# Patient Record
Sex: FEMALE | Race: WHITE
[De-identification: names, ages, dates, MRNs, and addresses within clinical notes are randomized per-mention and may not be internally consistent; named-entity substitution may affect disease eponyms.]

---

## 2017-02-20 ENCOUNTER — HOSPITAL ENCOUNTER (OUTPATIENT)
Dept: HOSPITAL 25 - ED | Age: 82
Setting detail: OBSERVATION
LOS: 1 days | Discharge: HOME | End: 2017-02-21
Attending: HOSPITALIST | Admitting: INTERNAL MEDICINE
Payer: MEDICARE

## 2017-02-20 DIAGNOSIS — I16.0: Primary | ICD-10-CM

## 2017-02-20 DIAGNOSIS — I67.82: ICD-10-CM

## 2017-02-20 DIAGNOSIS — Z79.899: ICD-10-CM

## 2017-02-20 DIAGNOSIS — I48.91: ICD-10-CM

## 2017-02-20 DIAGNOSIS — Z79.01: ICD-10-CM

## 2017-02-20 DIAGNOSIS — R51: ICD-10-CM

## 2017-02-20 DIAGNOSIS — Z85.51: ICD-10-CM

## 2017-02-20 DIAGNOSIS — M62.81: ICD-10-CM

## 2017-02-20 DIAGNOSIS — R00.1: ICD-10-CM

## 2017-02-20 DIAGNOSIS — E04.1: ICD-10-CM

## 2017-02-20 DIAGNOSIS — Z88.2: ICD-10-CM

## 2017-02-20 LAB
ALBUMIN SERPL BCG-MCNC: 3.9 G/DL (ref 3.2–5.2)
ALP SERPL-CCNC: 61 U/L (ref 34–104)
ALT SERPL W P-5'-P-CCNC: 10 U/L (ref 7–52)
ANION GAP SERPL CALC-SCNC: 7 MMOL/L (ref 2–11)
AST SERPL-CCNC: 14 U/L (ref 13–39)
BUN SERPL-MCNC: 24 MG/DL (ref 6–24)
BUN/CREAT SERPL: 23.3 (ref 8–20)
CALCIUM SERPL-MCNC: 10 MG/DL (ref 8.6–10.3)
CHLORIDE SERPL-SCNC: 107 MMOL/L (ref 101–111)
GLOBULIN SER CALC-MCNC: 2.7 G/DL (ref 2–4)
GLUCOSE SERPL-MCNC: 101 MG/DL (ref 70–100)
HCO3 SERPL-SCNC: 27 MMOL/L (ref 22–32)
HCT VFR BLD AUTO: 41 % (ref 35–47)
HGB BLD-MCNC: 13.7 G/DL (ref 12–16)
MCH RBC QN AUTO: 29 PG (ref 27–31)
MCHC RBC AUTO-ENTMCNC: 34 G/DL (ref 31–36)
MCV RBC AUTO: 85 FL (ref 80–97)
POTASSIUM SERPL-SCNC: 4.1 MMOL/L (ref 3.5–5)
PROT SERPL-MCNC: 6.6 G/DL (ref 6.4–8.9)
RBC # BLD AUTO: 4.75 10^6/UL (ref 4–5.4)
SODIUM SERPL-SCNC: 141 MMOL/L (ref 133–145)
TROPONIN I SERPL-MCNC: 0 NG/ML (ref ?–0.04)
WBC # BLD AUTO: 7.8 10^3/UL (ref 3.5–10.8)

## 2017-02-20 PROCEDURE — 70490 CT SOFT TISSUE NECK W/O DYE: CPT

## 2017-02-20 PROCEDURE — 36415 COLL VENOUS BLD VENIPUNCTURE: CPT

## 2017-02-20 PROCEDURE — 85025 COMPLETE CBC W/AUTO DIFF WBC: CPT

## 2017-02-20 PROCEDURE — 80048 BASIC METABOLIC PNL TOTAL CA: CPT

## 2017-02-20 PROCEDURE — 83036 HEMOGLOBIN GLYCOSYLATED A1C: CPT

## 2017-02-20 PROCEDURE — 71010: CPT

## 2017-02-20 PROCEDURE — 87086 URINE CULTURE/COLONY COUNT: CPT

## 2017-02-20 PROCEDURE — 96372 THER/PROPH/DIAG INJ SC/IM: CPT

## 2017-02-20 PROCEDURE — G0378 HOSPITAL OBSERVATION PER HR: HCPCS

## 2017-02-20 PROCEDURE — 93306 TTE W/DOPPLER COMPLETE: CPT

## 2017-02-20 PROCEDURE — 83605 ASSAY OF LACTIC ACID: CPT

## 2017-02-20 PROCEDURE — 80053 COMPREHEN METABOLIC PANEL: CPT

## 2017-02-20 PROCEDURE — 70450 CT HEAD/BRAIN W/O DYE: CPT

## 2017-02-20 PROCEDURE — 96374 THER/PROPH/DIAG INJ IV PUSH: CPT

## 2017-02-20 PROCEDURE — 84443 ASSAY THYROID STIM HORMONE: CPT

## 2017-02-20 PROCEDURE — 85730 THROMBOPLASTIN TIME PARTIAL: CPT

## 2017-02-20 PROCEDURE — 84484 ASSAY OF TROPONIN QUANT: CPT

## 2017-02-20 PROCEDURE — 80061 LIPID PANEL: CPT

## 2017-02-20 PROCEDURE — 70551 MRI BRAIN STEM W/O DYE: CPT

## 2017-02-20 PROCEDURE — 93005 ELECTROCARDIOGRAM TRACING: CPT

## 2017-02-20 PROCEDURE — 81003 URINALYSIS AUTO W/O SCOPE: CPT

## 2017-02-20 PROCEDURE — 85610 PROTHROMBIN TIME: CPT

## 2017-02-20 PROCEDURE — 81015 MICROSCOPIC EXAM OF URINE: CPT

## 2017-02-20 PROCEDURE — 99284 EMERGENCY DEPT VISIT MOD MDM: CPT

## 2017-02-20 RX ADMIN — AMLODIPINE BESYLATE SCH MG: 5 TABLET ORAL at 14:40

## 2017-02-20 RX ADMIN — LISINOPRIL SCH MG: 10 TABLET ORAL at 14:40

## 2017-02-20 RX ADMIN — HEPARIN SODIUM SCH UNITS: 5000 INJECTION INTRAVENOUS; SUBCUTANEOUS at 21:20

## 2017-02-20 RX ADMIN — Medication SCH DROP: at 21:20

## 2017-02-20 RX ADMIN — ACETAMINOPHEN PRN MG: 325 TABLET ORAL at 18:06

## 2017-02-20 RX ADMIN — Medication SCH: at 19:27

## 2017-02-20 NOTE — RAD
INDICATION: TIA symptoms     



COMPARISON: None



TECHNIQUE: Noncontrast axial source images were acquired from the skull base to the

vertex.



FINDINGS:



Ventricles/sulci: There is mild age-related cortical atrophy with compensatory dilatation

of the CSF spaces.



Brain parenchyma: There is mild periventricular and subcortical white matter change

compatible with chronic ischemia.



Intracranial hemorrhage:None.



Extra-axial spaces: There are no abnormal extra axial fluid collections or evidence of

extra-axial mass.



Calvarium: There is no calvarial fracture or other calvarial abnormality.



Scalp: There is no evidence of scalp or extracalvarial soft tissue abnormality.



Paranasal sinuses/mastoid: The paranasal sinuses and mastoid air cells are clear.



Other: None.



IMPRESSION: CORTICAL ATROPHY WITH CHRONIC MICROVASCULAR ISCHEMIC CHANGES. NO ACUTE

FINDINGS.

## 2017-02-20 NOTE — RAD
Indication: Left-sided numbness.



Sagittal and axial T1, axial T2, FLAIR, diffusion and susceptibility weighted images of

the brain were obtained.



Ventricular structures are midline. No midline shift is noted. There is central and

cortical atrophy noted. There is no evidence of intracranial mass or hemorrhage. No other

high or low signal lesions are identified. No restriction of diffusion is noted.



Periventricular signal abnormalities consistent with chronic ischemic White matter change

is noted.



Mastoid air cells and paranasal sinuses are unremarkable.



IMPRESSION: FINDINGS CONSISTENT WITH SMALL VESSEL ISCHEMIC DISEASE. NO ACUTE CHANGES ARE

NOTED.

## 2017-02-20 NOTE — HP
HISTORY AND PHYSICAL:

 

DATE OF ADMISSION:  02/20/17

 

PRIMARY CARE PROVIDER:  Dr. Sorto.

 

ATTENDING PHYSICIAN WHILE IN THE HOSPITAL:  Dontae Juan MD*(report dictated 
by Braxton Valderrama NP).

 

CONSULTING NEUROLOGIST:  Dr. Hinojosa.

 

CHIEF COMPLAINT:

1.  Left-sided decreased sensation.

2.  Elevated blood pressure.

 

HISTORY OF PRESENT ILLNESS:  Ms. Kumar is an 82-year-old female patient with a 
history of hypertension, atrial fibrillation in the past, history of kidney 
stones and bladder cancer.  She comes in today stating that over the last week, 
she had a cystoscopy for what she says a spot on her bladder.  Since then, she 
has been having difficulty with her blood pressure.  It has been elevated at 
times despite her taking her medications.  She states that she has had a 
headache off and on, and in addition to this, has had some left-sided ear pain 
and swelling of the left gland.  She does state that she noticed today she woke 
up, her blood pressure was very elevated.  Yesterday, she took actually 80 mg 
of her lisinopril thinking that that would bring it down but her blood pressure 
remained elevated despite this and it has been all week, it was up in the 200 
systolically.  She did not take her lisinopril today because she took so much 
yesterday.  She was concerned and went to her primary to get help with her 
blood pressure management.  She was evaluated by her primary.  The primary was 
concerned because she was complaining mostly today of having a left-sided 
decreased sensation in the face, arm, and the leg.  She says that she has not 
had any facial drooping.  There has been no weakness to one side and there has 
been no trouble with gait or balance and there has been no trouble with speech.
  She came to the ER.  She was evaluated.  It was noted that her blood 
pressures again were in the 200s and the hospitalist service was asked to 
evaluate for admission.  She also does admit to having spots in her left eye.  
She denies having any chest pain and she does admit to having some dyspnea on 
exertion, but no orthopnea, nocturnal dyspnea, or swelling in her legs.

 

PAST MEDICAL HISTORY:  Significant for:

1.  Hypertension.

2.  Nephrolithiasis.

3.  AFib.

4.  Bladder cancer.

 

PAST SURGICAL HISTORY:

1.  She has had an appendectomy.

2.  Laparoscopic cholecystectomy.

3.  Cataracts.

4.  Nephrectomy.

5.  Cystoscopy.

 

HOME MEDICATIONS:  Include:

1.  Sodium citrate and citric acid 10 mg p.o. b.i.d.

2.  Multivitamin 1 tablet daily.

3.  Metoprolol XL 50 mg daily.

4.  Lisinopril 20 mg daily.

5.  Aspirin 81 mg daily.

 

ALLERGIES TO MEDICATIONS:  Include SULFA.

 

FAMILY HISTORY:  Mother had a history of heart disease and colon cancer.  
Father had a history of liver disease.

 

SOCIAL HISTORY:  She does not smoke.  She drinks a glass of wine daily.  
Surrogate decision maker is her , Dany.

 

REVIEW OF SYSTEMS:  There is no documented fever.  She denied having any 
significant weight change.  There was no double vision.  There is no ear 
discharge. She denies having any rhinorrhea.  There is no sore throat.  There 
is no thyroid enlargement.  She did admit to having some dyspnea on exertion.  
No chest pain.  No orthopnea.  No nocturnal dyspnea.  There is no abdominal 
pain.  There is no nausea. There is no vomiting.  There was no dysuria.  No 
frequency.  No seizure.  No loss of consciousness.  No pruritus and no skin 
ulcerations.  Review of 14 systems completed, all others negative.

 

                               PHYSICAL EXAMINATION

 

GENERAL:  At this time, Ms. Kumar is an 82-year-old female patient.  She is 
sitting in the ER stretcher.  She does not appear to be in any acute distress.  
She is awake and alert.  She is oriented x3.

 

VITAL SIGNS:  Blood pressure 204/107 with a pulse of 75, respirations 18, O2 
sat 97%, and temperature 97.8.

 

HEENT:  Head is atraumatic and normocephalic.  Eyes:  EOMs intact.  Sclerae are 
anicteric and not pale.  Throat:  Oral mucosa appears to be moist.  No 
oropharyngeal erythema.

 

NECK:  Supple.

 

LUNGS:  Clear to auscultation.  No wheezes, rales, or rhonchi.

 

HEART:  Sounds S1, S2.  Regular rate and rhythm.  No murmurs, rubs, or gallops.

 

ABDOMEN:  Soft, flat, and nontender.  Bowel sounds present.

 

EXTREMITIES:  Pulses 2+ throughout.  She is able to move all 4 extremities with 
5/5 strength.

 

NEUROLOGIC:  The patient is awake, alert, and oriented x3.  Her speech is 
clear. Tongue is midline.   are equal.  Finger-to-nose intact bilaterally.
  Heel-to- shin intact bilaterally.  She did have some sensation deficit on the 
left side subjectively at this point.

 

SKIN:  Grossly intact.

 

 LABORATORY DATA AND DIAGNOSTIC STUDIES:  Today revealed a WBC of 7.8, RBC of 
4.75, hemoglobin 13.7, hematocrit of 41, platelet count 233.  INR 0.96.  PTT 
28.7. Sodium was 140, potassium 4.1, chloride of 107, bicarb 27, BUN 24, 
creatinine of 1.03, glucose 101, lactic 0.9, calcium 10.  Total bilirubin 1.8, 
AST 14, ALT 10, alk phos 51.  Troponin 0.  Albumin 3.9.

 

She had an EKG obtained today, which showed sinus bradycardia at a rate of 58.  
She had T-wave flattening in 1, 2, and 3 and aVF; inversion in V4 and V5 and 
biphasics in V6.  It was reviewed with the previous, it is similar.  The 
biphasic T waves in V6 is new.  There are no other significant changes from her 
previous EKG.

 

Brain CT obtained today showed cortical atrophy with chronic microvascular 
ischemic change.  No acute findings.  Old medical records were reviewed.

 

ASSESSMENT AND PLAN:  Ms. Kumar is an 82-year-old female patient coming in to 
the ER today with complaints of decreased sensation to her left side.  On 
evaluation in the ER, it was noted that her blood pressures were in the 200s, 
it is now 193/84. Hospitalist service was asked to evaluate in admission due to 
the elevated blood pressure and concern for hypertensive urgency and focal 
neuro findings.  She will be admitted under observation status for:

 

1.  Hypertensive urgency:  At this point, I would like to get her blood 
pressure in the 160 range.  I will go ahead and just give her Norvasc now and 
give her lisinopril.  She did not take this.  She got 5 of Lopressor already.  
We will monitor.  If this does not help, I will add on p.r.n. hydralazine and 
will continue to follow.  I suspect the numbness she is having is from the 
blood pressure.  I will get an echo as well.

2.  Left-sided decreased sensation:  I will go ahead and check an MRI on the 
patient.  At this point, we will get neuro checks, lipid panel, A1c.  Aspirin 
has been started.  Neuro consult.  In addition to this, place her on telemetry 
and we will get an MRI.

3.  Atrial fibrillation:  Again, she has a history of this.  She is in sinus 
rhythm now, we will monitor on telemetry and will follow.

4.  Bladder cancer:  Follow up with her primary.

5.  Kidney stones:  Follow up with her primary.

6.  DVT prophylaxis:  I am going to place her on SCDs.  I do not want to place 
her on heparin with her blood pressure being in the 200s.

7.  Code status:  Full code.

8.  Fluids, electrolytes, and nutrition:  She can be on a heart healthy diet.

 

TIME SPENT:  Time spent on the admission was approximately 60 minutes; greater 
than half the time was spent face-to-face with the patient obtaining my history 
and physical, other half the time spent going over the plan of care with the 
patient and implementing plan of care.  I did discuss the plan of care with my 
attending, Dr. Juan; he is in agreement.

 

 ____________________________________ BRAXTON VALDERRAMA NP



CC:  Dr. Sorto; Dr. Hinojosa *

 

[id number]/[facility] #: [id number]

MTDJOE

## 2017-02-20 NOTE — RAD
HISTORY: Hypertension



COMPARISONS: May 05, 2003



VIEWS:1: Single frontal portable view of the chest at 3:05 PM



FINDINGS:

LINES AND TUBES: None.

CARDIOMEDIASTINAL SILHOUETTE: The cardiomediastinal silhouette is normal for portable

technique.

PLEURA: The costophrenic angles are sharp. No pleural abnormalities are noted.

LUNG PARENCHYMA: There is hyperinflation.

ABDOMEN: The upper abdomen is clear. There is no subphrenic gas.

BONES AND SOFT TISSUES: No bone or soft tissue abnormalities are noted.



IMPRESSION: NO ACTIVE CARDIOPULMONARY DISEASE.

## 2017-02-21 VITALS — SYSTOLIC BLOOD PRESSURE: 128 MMHG | DIASTOLIC BLOOD PRESSURE: 64 MMHG

## 2017-02-21 LAB
ANION GAP SERPL CALC-SCNC: 5 MMOL/L (ref 2–11)
BUN SERPL-MCNC: 21 MG/DL (ref 6–24)
BUN/CREAT SERPL: 21.9 (ref 8–20)
CALCIUM SERPL-MCNC: 9.4 MG/DL (ref 8.6–10.3)
CHLORIDE SERPL-SCNC: 107 MMOL/L (ref 101–111)
CHOLEST SERPL-MCNC: 146 MG/DL
GLUCOSE SERPL-MCNC: 106 MG/DL (ref 70–100)
HCO3 SERPL-SCNC: 27 MMOL/L (ref 22–32)
HCT VFR BLD AUTO: 37 % (ref 35–47)
HDLC SERPL-MCNC: 43.4 MG/DL
HGB BLD-MCNC: 12.7 G/DL (ref 12–16)
MCH RBC QN AUTO: 29 PG (ref 27–31)
MCHC RBC AUTO-ENTMCNC: 35 G/DL (ref 31–36)
MCV RBC AUTO: 85 FL (ref 80–97)
POTASSIUM SERPL-SCNC: 3.6 MMOL/L (ref 3.5–5)
RBC # BLD AUTO: 4.33 10^6/UL (ref 4–5.4)
SODIUM SERPL-SCNC: 139 MMOL/L (ref 133–145)
TRIGL SERPL-MCNC: 122 MG/DL
TSH SERPL-ACNC: 4.98 MCIU/ML (ref 0.34–5.6)
WBC # BLD AUTO: 5.8 10^3/UL (ref 3.5–10.8)

## 2017-02-21 RX ADMIN — Medication SCH DROP: at 08:40

## 2017-02-21 RX ADMIN — AMLODIPINE BESYLATE SCH MG: 5 TABLET ORAL at 08:38

## 2017-02-21 RX ADMIN — HEPARIN SODIUM SCH UNITS: 5000 INJECTION INTRAVENOUS; SUBCUTANEOUS at 06:07

## 2017-02-21 RX ADMIN — ACETAMINOPHEN PRN MG: 325 TABLET ORAL at 03:29

## 2017-02-21 RX ADMIN — HEPARIN SODIUM SCH UNITS: 5000 INJECTION INTRAVENOUS; SUBCUTANEOUS at 15:59

## 2017-02-21 RX ADMIN — LISINOPRIL SCH MG: 10 TABLET ORAL at 08:37

## 2017-02-21 NOTE — RAD
HISTORY: Left parotid and submandibular tenderness, left facial numbness



COMPARISONS: None



TECHNIQUE: Multiple contiguous axial CT scans were obtained of the neck without

intravenous contrast, with coronal and sagittal multiplanar reformations.    



FINDINGS:



The study is limited by the lack of intravenous contrast. This limits evaluation of the

solid organs and vasculature.





BRAIN AND ORBITS: The visualized brain and orbits are normal.

PARANASAL SINUSES: There is mucosal thickening of a sphenoethmoidal air cell on the right



SALIVARY GLANDS: The parotid glands, submandibular glands, sublingual glands are normal.

There is no appreciable sialolithiasis or salivary ductal dilatation, though evaluation of

the distal ducts is limited by streak artifact from dental hardware.



NASAL CAVITY/NASOPHARYNX: The nasal cavity and nasopharynx are normal.



ORAL CAVITY/OROPHARYNX: The oral cavity is obscured by streak artifact from dental

amalgam. The visualized oral cavity and oropharynx are unremarkable.

LARYNGEAL APPARATUS/HYPOPHARYNX: The laryngeal apparatus and hypopharynx are normal.



UPPER AIRWAY/UPPER ESOPHAGUS: The visualized upper airway and esophagus are normal.

LUNG APICES: The lung apices are clear.



THYROID GLAND: There are multiple thyroid nodules measuring up to 0.7 cm m in size. The

thyroid is heterogeneous and somewhat atrophic.



LYMPH NODES: There is no lymphadenopathy by size criteria.



VASCULATURE: The vasculature is unremarkable.



BONES AND SOFT TISSUES: Degenerative changes are noted most pronounced at C5-C6.



OTHER: None.



IMPRESSION: 

1.  NO APPRECIABLE SIALOLITHIASIS OR SALIVARY DUCTAL DILATATION.

2.  MULTIPLE THYROID NODULES. RECOMMEND CORRELATION WITH DEDICATED IMAGING OF THE THYROID.

## 2017-02-21 NOTE — ECHO
Patient:      MCKAY CORONADO ANN

Med Rec#:     [id number]            :          1934          

Date:         2017            Age:          82y                 

Account#:     [id number]          Height:       167.64 cm / 66.0 in

Accession#:   [id number]           Weight:       71.21 kg / 156.9 lbs

Sex:          F                     BSA:          1.8

Room#:        [facility]                

Admit Date#:  2017          

Type:         Inpatient

 

Referring:    Braxton Valderrama NP

Reading:      Beto Leblanc MD

Sonographer:  José Cash RDCS

______________________________________________________________________

 

Transthoracic Echocardiogram

 

Indication:

HTN

BP:           129/67

HR:           71

Rhythm:       NSR

 

Findings     

History:

HTN,AFIB 

 

Technical Comments:

The study quality is fair.  

 

Left Ventricle:

The left ventricular chamber size is normal. Mild concentric left

ventricular hypertrophy is observed. Global left ventricular wall motion

and contractility are within normal limits. There is normal left

ventricular systolic function. The estimated ejection fraction is

55-60%.  Abnormal left ventricular diastolic filling is observed,

consistent with impaired relaxation.  

 

Left Atrium:

The left atrial chamber size is normal. 

 

Right Ventricle:

The right ventricular cavity size is normal. The right ventricular

global systolic function is normal. 

 

Right Atrium:

The right atrial cavity size is normal. 

 

Aortic Valve:

The aortic valve is trileaflet. There is no evidence of aortic

regurgitation. There is no evidence of aortic stenosis. 

 

Mitral Valve:

The mitral valve leaflets appear normal. There is no evidence of mitral

regurgitation. There is no evidence of mitral stenosis. 

 

Tricuspid Valve:

The tricuspid valve structure is not well visualized. 

 

Pulmonic Valve:

The pulmonic valve structure is not well visualized. 

 

Pericardium:

There is no pericardial effusion. 

 

Aorta:

There is no dilatation of the ascending aorta. There is no dilatation of

the aortic arch. There is no dilation of the aortic root. 

 

Pulmonary Artery:

The main pulmonary artery is not well visualized. 

 

Venous:

The venous system is not well visualized. The inferior vena cava is not

visualized. 

 

Summary:

There was not any prior study for comparison. 

 

Conclusions

Global left ventricular wall motion and contractility are within normal

limits.

There is normal left ventricular systolic function.

The estimated ejection fraction is 55-60%. 

Abnormal left ventricular diastolic filling is observed, consistent with

impaired relaxation. 

There is no evidence of aortic regurgitation.

There is no evidence of mitral regurgitation.

The tricuspid valve structure is not well visualized.

There is no pericardial effusion.

There was not any prior study for comparison.

 

Measurements     

Name                    Value         Normal Range            

RVIDd (AP) 2D           2.2 cm        (0.9 - 2.6)             

RVDdMajor (2D)          2.8 cm        (2.2 - 4.4)             

RAd ISD 4CH             4.1 cm        (3.4 - 4.9)             

RA (A4C)W               2.8 cm        (2.9 - 4.6)             

IVSd (2D)               1.3 cm        (0.6 - 1)               

LVPWd (2D)              1.1 cm        (0.6 - 1)               

LVIDd (2D)              5.1 cm        (3.6 - 5.4)             

LVIDs (2D)              3.4 cm        -                        

LV FS (2D)              33 %          (25 - 45)               

Aortic Annulus          1.8 cm        (1.4 - 2.6)             

Ao root diameter (2D)   2.9 cm        (2.1 - 3.5)             

Ascending Ao            2.8 cm        (2.1 - 3.4)             

Aortic arch             1.7 cm        (1.8 - 3.4)             

LA dimension (AP) 2D    3.7 cm        (2.3 - 3.8)             

LAd ISD 4CH             4.5 cm        (2.9 - 5.3)             

LA ISD 4CH W            2.8 cm        (2.5 - 4.5)             

 

Name                    Value         Normal Range            

LA ESV SP 4CH (A/L)     33 ml         -                        

LA ESV SP 2CH (A/L)     41 ml         -                        

LA ESV BP (A/L)         37 ml         -                        

LA ESV BP (A/L) index   20.66 ml/m2   -                        

LA ESV SP 4CH (MOD)     31 ml         -                        

LA ESV SP 2CH (MOD)     39 ml         -                        

 

Name                    Value         Normal Range            

MV E-wave Vmax          0.56 m/sec    -                        

MV deceleration time    239 msec      -                        

MV A-wave Vmax          0.76 m/sec    -                        

MV E:A ratio            0.74 ratio    -                        

LV septal e' Vmax       0.04 m/sec    -                        

LV lateral e' Vmax      0.04 m/sec    -                        

LV E:e' septal ratio    14 ratio      -                        

LV E:e' lateral ratio   14 ratio      -                        

 

Name                    Value         Normal Range            

LVOT diameter           1.9 cm        -                        

LVOT Vmax               0.9 m/sec     -                        

 

Name                    Value         Normal Range            

PV Vmax                 0.64 m/sec    -                        

 

Electronically signed by: Beto Leblanc MD on 2017 16:37:43

## 2017-02-21 NOTE — PN
Progress Note





- Progress Note


SOAP: 


2/21/17 neurology progress note





81 yo chronic HTN, chronic afib, admitted after finding herself hypertensive on 

home check (SBP up to 200s) and in context of headaches, resolved sensory 

symptoms (billed as numbness; she was seen by dr corey over the weekend; 

his consult noted more vague pain and sensory issues; she tells me today it was 

neck pain, and also mentions feeling an eye blink discrepancy in the context of 

remotely operated redundant eylid folds). She had no hard lateralizing or focal 

complaints or exam findings, and negative CT/MRI; she was not felt to have had 

a cns vascular event. She is on a baby ASA at baseline. Asked to eval by [facility] 

team re discussion for possible long term anticoag.no use of gait assist 

devices.


Her neuro exam is non localizing; she has redundant eyelid folds





Chem, cbc, aic, lipids are all normal; she had a normal ESR 6/16. Neck CT was 

neg for salivary stones or issues (done for facial sensory pains/complaints as 

above); cxr neg; mri brain neg (reviewed and modest microvasc disease and 

couple tiny microbleeds only; no acute stroke)





i/p:


81 yo presenting with resolved headaches and poorly localizing pain/sensory 

complaints in context of chronic HTN. Exam and imaging negative; concur with 

colleague that does not sounds like a stroke or TIA. Nonetheless, she has a 

history of afib, and is a reasonable candidate for long term use of a NOAC for 

stroke primary prevention, same as with chronic long term BP control; the [facility] 

hospital attending and I reviewed these issues with the patient and her 

, and the med team will coordinate further discussion with the patients PMD. 

From a neuro perspective she can be discharged home.

## 2017-02-21 NOTE — CONS
NEUROLOGY CONSULTATION REPORT:

 

DATE OF CONSULT:  02/20/17

 

REQUESTING PROVIDER:  Braxton Valderrama NP

 

PRIMARY CARE PHYSICIAN:  Dr. Anil Sorto.

 

REASON FOR CONSULT:  Left-sided numbness.

 

HISTORY OF PRESENT ILLNESS:  The patient is an 82-year-old right-handed female 
with history of hypertension, who has been having unusually elevated blood 
pressure since she had a cystoscopy last week.  Along with that, she had some 
intermittent headache and also some pain and discomfort around the left sub-
mandibular area and swelling of a left gland and also pain in the ears.  She 
went to her primary care physician today, who sent her to the ER because of the 
concern of report of some numbness in the left side of the face and probably 
arm.  Upon conversation with the patient, however, she did not report a clear 
"numbness" in the arms or legs, but felt that the left side of her face 
probably feels a bit different compared to the right side.  She denies any 
other associated symptoms such as no blurry vision, dysarthria, dizziness, 
weakness in the arms and legs. In the ED her blood pressure was found to be 
high with systolic in the range of 170 to 200 mmHg.

 

PAST MEDICAL HISTORY:

1.  Hypertension.

2.  AFib.

3.  Possibly bladder cancer.

4.  Kidney stone.

5.  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

 

PAST SURGICAL HISTORY:

1.  Laparoscopic cholecystectomy.

2.  Appendectomy.

3.  Nephrectomy of the left side due to a benign cyst.

4.  Cataract surgery.

 

MEDICATIONS:  Include at home:

1.  Multivitamin.

2.  Vitamin C.

3.  Metoprolol XL 50 mg daily.

4.  Lisinopril 20 mg p.o. daily.

5.  Aspirin 81 mg p.o. daily.

 

ALLERGIES:  To SULFA DRUGS.

 

FAMILY HISTORY:  There is a history of heart disease and cancer in her mother.  
No clear neurological history in the family.

 

SOCIAL HISTORY:  No history of smoking.  She drinks a glass of wine almost 
daily.

 

REVIEW OF SYSTEMS:  Complete review of systems was performed and other than 
what mentioned in HPI is negative.

 

PHYSICAL EXAM:  Temperature 97.5, pulse rate 60, respiratory rate 16, O2 sat 99%
, blood pressure 209/107.  On neurological exam, the patient is awake, alert, 
and oriented x3.  Pupils are symmetric and reactive to light.  Extraocular 
movements are intact.  Visual fields are intact to confrontation.  Face is 
symmetric.  Tongue is midline.  Palate elevates upwards.  V1 to V3 are intact 
to light touch and pinprick on the right side.  On the left side, there is a 
slight decrease to pinprick sensation in the V1 and V2 distribution.  There is 
some mild tenderness in the left submandibular area but no clear lymph node was 
palpated. Strength is 5/5 throughout.  There is no pronator drift.  V1 to V3 
are intact to light touch and pinprick on the right side. On the left side, her 
response to sensory exam is inconsistent and there is sometimes decreased 
sensation in some parts of the arm compared to the right.  Sensation is intact 
to lower extremities bilaterally.  Finger-to-nose is intact.  Rapid alternative 
movements are intact.

 

DIAGNOSTIC STUDIES/LAB DATA:  WBC 7.8, hemoglobin 13.7, hematocrit 41.  Sodium 
141, potassium 4.1, BUN 24, creatinine 1.03.  Urine is negative.  INR is 0.96.

 

Imaging:  An MRI of the brain shows findings consistent with small vessel 
ischemic disease with no acute changes.

 

ASSESSMENT AND PLAN:  An 82-year-old female with history of hypertensive urgency
, presented with symptoms of vague sensory change in the left side of the face 
and probably somewhat in the arm.  On neurological exam, there are no clear 
findings suggestive of a central pathology.  MRI is negative for an acute 
stroke but some small vessel ischemic changes were found.  There is a mild 
tenderness around the left parotid gland, or probably in the submandibular area 
on the left side.  She probably may need some more imaging investigation such 
as soft tissue CT of the neck. Otherwise, there are no clear neurological 
deficits concerning for vascular events at this time.



CC:  Dr. Anil Sorto*

 

 [id number]/[facility] #: [id number]

MTDD

## 2017-02-22 NOTE — DS
DISCHARGE SUMMARY:

 

DATE OF ADMISSION:  02/20/17

 

DATE OF DISCHARGE:  02/21/17

 

DISCHARGE DIAGNOSES:

1.  Hypertensive urgency.

2.  Atypical facial pain.

3.  Incidental findings of thyroid nodules.

 

SECONDARY DIAGNOSES:

1.  Hypertension.

2.  Nephrolithiasis.

3.  Atrial fibrillation.

4.  History of bladder carcinoma.

5.  Report of severe sepsis secondary to urinary tract infection in May 2016 at 
[facility].

6.  Status post appendectomy.

7.  Status post lap cholecystotomy.

8.  Status post cataract surgery.

9.  Status post nephrectomy.

10.  Status post cystoscopy.

 

MEDICATION LIST:

1.  Lisinopril 20 mg p.o. daily.

2.  Metoprolol succinate 50 mg p.o. daily.

3.  Multivitamin 1 tablet p.o. daily.

4.  Sodium citrate and citric acid 10 mg p.o. b.i.d.

 

NEW MEDICATIONS:

1.  Amlodipine 10 mg p.o. daily.

2.  Xarelto 20 mg p.o. daily.

 

HOSPITAL COURSE:  Mrs. Kumar is an 82-year-old lady with a past medical 
history as stated above who presented to the emergency room with complaints of 
left-sided ear pain and a "prickly" sensation on her left side of her face.  
The patient is also having elevated blood pressures at home.  The day prior to 
admission, the systolic blood pressure was in the 200s and she took an extra 
dose of her lisinopril, but her numbers remained elevated.  She contacted her 
PCP and was referred to the emergency room.  For more details about her 
presentation, I refer you to her history and physical.

 

The patient had the CT of the brain that showed cortical atrophy with chronic 
microvascular ischemic changes, but no acute findings.  She also had an MRI of 
the brain that showed findings consistent with small vessel ischemic disease, 
no acute changes were noted.

 

The patient was admitted for blood pressure control as her blood pressure in 
the emergency room was 210/93.

 

She was seen consultation by Neurology (Dr. Hinojosa) and his impression was 
that the patient was an 82-year-old female with history of hypertensive urgency 
that presented with symptoms of vague sensory change in the left side of the 
face and probably somewhat in her arms.  On neurological exam, there are no 
clear findings suggestive of a central pathology.  MRI is negative for an acute 
stroke, but some small vessel ischemic changes were found.  There is mild 
tenderness around the left parotid gland or probably in the submandibular area 
on the left side.  He recommended a soft tissue CT of the neck, but otherwise 
he did not feel there was a clear neurological deficit concerning for vascular 
event at that time.

 

Amlodipine was added to the patient's regimen with good blood pressure control 
and the plan is to continue it as outpatient.

 

The patient did have a CT of the soft tissues of the neck without contrast that 
showed no appreciable sialolithiasis or ductal dilatation.  She had an 
incidental findings of multiple thyroid nodules and she can have dedicated 
imaging of the thyroid as outpatient.

 

Please note her TSH level was added to her labs but the result is pending at 
the time of this dictation and will need to be followed as outpatient.

 

On physical examination, the patient does have some pain on palpation of the 
TMJ area and this may be the source of her discomfort.

 

The patient has a history of atrial fibrillation.  She states that she was 
diagnosed more than 15 years ago, she does not even remember which doctor 
diagnosed her.  She is on aspirin at this time and her CHADS2 score at this 
time is 4 (hypertension, age, sex).  I discussed with the patient the 
indication for anticoagulation.  She was seen in consultation by Neurology (Dr. Reynolds).  He concurred with Dr. Hinojosa that her symptoms did not sound 
like a stroke or TIA, but nonetheless she has a history of AFib, is a 
reasonable candidate for long-term use of a NOAC for a stroke primary 
prevention same with chronic long-term blood pressure control.

 

I discussed the case with her primary care provider, Dr. Sorto, and after 
reviewing risks and benefits, the patient is in agreement with starting Xarelto 
for stroke prevention.  She was educated about the risks of bleeding, but she 
understands that at this time, the benefit surpasses the risks.

 

The patient also had a transthoracic echocardiogram that showed ejection 
fraction of 55% to 60%, no evidence of aortic regurgitation, no evidence of 
mitral regurgitation, no pericardial effusion.

 

The patient was felt to be stable for discharge at this time to follow up with 
Dr. Sorto on 02/24/17 at 1:00 p.m.

 

PHYSICAL EXAMINATION:  Vital signs:  Temperature 98.2, heart rate is 66, 
respiratory rate is 16, oxygen saturation 98% on room air, blood pressure is 128
/64.  General:  The patient is a pleasant elderly lady, lying in bed, in no 
acute distress.  CVS:  Normal S1 and S2.  Regular rate and rhythm.  Chest:  
Breath sounds present bilaterally with no added sounds.  Abdomen:  Soft, 
nontender, nondistended.  Bowel sounds are present.  Extremities:  No edema.  
Neuro:  She is alert, awake, oriented x3.  Able to move all 4 extremities.

 

DIET:  Healthy diet.

 

ACTIVITY:  Activities as tolerated.

 

DISPOSITION:  To home.

 

STATUS WHILE IN THE HOSPITAL:  Observation.

 

Please keep in mind that this is a summarized version of this patient's 
hospital stay.  If you need more information, please feel free to call me at [phone]
[phone] or please obtain the full medical records.

 

TIME SPENT:  Approximately 45 minutes were spent to complete this discharge.

 

CC:  Dr. Sorto; Dr. Escamilla, [place], [place], phone number [phone]*



 [id number]/[facility] #: [id number]

MTDD

## 2017-03-04 NOTE — ED
Elissa BOYD Alok, scribed for Aquiles Ga MD on 02/20/17 at 1327 .





Hypertension





- HPI Summary


HPI Summary: 





83 y/o female presents to the ED with concern of her hypertension. She states 

that for the last few days her BP has been fairly high. On top of this, pt also 

reports feeling some chills, as well as some tingling sensations on the left 

side of her face, ear and neck. Additionally, pt reports SOB on exertion for 

the past 2-3 days. She denies any trouble speaking, ambulating, chest pain, 

fever, diaphoresis, abd pain, diarrhea as well as no recent changes in 

medication. She states that she normally takes 20 mg of Lisinopril per day, but 

took twice as much yesterday in order to try and lower BP to little effect.





- History of Current Complaint


Chief Complaint: EDShortnessOfBreath


Stated Complaint: HIGH BP


Time Seen by Provider: 02/20/17 11:02


Hx Obtained From: Patient


Onset/Duration: Started Days Ago, Atraumatic, Still Present


Associated Signs & Symptoms: SOB





- Allergies/Home Medications


Allergies/Adverse Reactions: 


 Allergies











Allergy/AdvReac Type Severity Reaction Status Date / Time


 


Sulfa Drugs Allergy Intermediate Hives Verified 02/20/17 10:12











Home Medications: 


 Home Medications





Aspirin EC Low Dose* [Ecotrin EC Low Dose*] 81 mg PO DAILY 02/20/17 [History 

Confirmed 02/20/17]


Lisinopril TAB* [Prinivil TAB*] 20 mg PO DAILY 02/20/17 [History Confirmed 02/20 /17]


Sodium Citrate & Citric Acid [Virtrate-2 500-334 mg/5Ml] 10 mg PO BID 02/20/17 [

History Confirmed 02/20/17]











PMH/Surg Hx/FS Hx/Imm Hx





- Surgical History


Surgery Procedure, Year, and Place: RIGHT TKA.  CHOLECYSTECTOMY.  RIGHT KIDNEY 

REMOVED @ age 26.  ureter stent placement & removal Nov 2016


Infectious Disease History: No


Infectious Disease History: 


   Denies: Traveled Outside the [place] in Last 30 Days





- Family History


Known Family History: 


   Negative: Diabetes





- Social History


Alcohol Use: None


Alcohol Amount: one wine


Substance Use Type: Reports: None


Smoking Status (MU): Former Smoker





Review of Systems


Positive: Chills.  Negative: Fever, Skin Diaphoresis


Negative: Erythema


Negative: Sore Throat


Negative: Chest Pain


Positive: Shortness Of Breath.  Negative: Cough


Negative: Abdominal Pain, Vomiting, Diarrhea, Nausea


Negative: dysuria, hematuria


Negative: Myalgia, Edema


Negative: Rash


Neurological: Other - Negative: Dizziness


Positive: Paresthesia - tingling left side of face 


Negative: Anxious


All Other Systems Reviewed And Are Negative: Yes





Physical Exam





- Summary


Physical Exam Summary: 





Constitutional: Well-developed, Well-nourished, Alert. (-) Distressed


Skin: Warm, Dry


HENT: Normocephalic; Atraumatic


Eyes: Conjunctiva normal


Neck: Musculoskeletal ROM normal neck. (-) JVD, (-) Stridor, (-) Tracheal 

deviation


Cardio: Rhythm regular, rate normal, Heart sounds normal; Intact distal pulses; 

The pedal pulses are 2+ and symmetric. Radial pulses are 2+ and symmetric. (-) 

Murmur


Pulmonary/Chest wall: Effort normal. (-) Respiratory distress, (-) Wheezes, (-) 

Rales


Abd: Soft, (-) Tenderness, (-) Distension, (-) Guarding, (-) Rebound


Musculoskeletal: (-) Edema


Lymph: (-) Cervical adenopathy


Neuro: Alert, Oriented x3


Psych: Mood and affect Normal





Triage Information Reviewed: Yes


Vital Signs On Initial Exam: 


 Initial Vitals











Temp Pulse Resp BP Pulse Ox


 


 97.8 F   64   16   196/98   100 


 


 02/20/17 10:13  02/20/17 10:13  02/20/17 10:13  02/20/17 10:13  02/20/17 10:13











Vital Signs Reviewed: Yes





- [place] Coma Scale


Coma Scale Total: 15





Diagnostics





- Vital Signs


 Vital Signs











  Temp Pulse Resp BP Pulse Ox


 


 02/20/17 12:00   57   162/76  96


 


 02/20/17 11:45   58   158/79  96


 


 02/20/17 11:30   59   164/79  99


 


 02/20/17 11:21   60   187/86  98


 


 02/20/17 11:00   58    98


 


 02/20/17 10:48   59   183/74  99


 


 02/20/17 10:35   62    95


 


 02/20/17 10:33     210/93 


 


 02/20/17 10:13  97.8 F  64  16  196/98  100














- Laboratory


Lab Results: 


 Lab Results











  02/20/17 02/20/17 02/20/17 Range/Units





  10:40 10:40 10:40 


 


WBC  7.8    (3.5-10.8)  10^3/ul


 


RBC  4.75    (4.0-5.4)  10^6/ul


 


Hgb  13.7    (12.0-16.0)  g/dl


 


Hct  41    (35-47)  %


 


MCV  85    (80-97)  fL


 


MCH  29    (27-31)  pg


 


MCHC  34    (31-36)  g/dl


 


RDW  14    (10.5-15)  %


 


Plt Count  232    (150-450)  10^3/ul


 


MPV  9    (7.4-10.4)  um3


 


Neut % (Auto)  68.9    (38-83)  %


 


Lymph % (Auto)  16.6 L    (25-47)  %


 


Mono % (Auto)  8.1    (1-9)  %


 


Eos % (Auto)  5.4    (0-6)  %


 


Baso % (Auto)  1.0    (0-2)  %


 


Absolute Neuts (auto)  5.4    (1.5-7.7)  10^3/ul


 


Absolute Lymphs (auto)  1.3    (1.0-4.8)  10^3/ul


 


Absolute Monos (auto)  0.6    (0-0.8)  10^3/ul


 


Absolute Eos (auto)  0.4    (0-0.6)  10^3/ul


 


Absolute Basos (auto)  0.1    (0-0.2)  10^3/ul


 


Absolute Nucleated RBC  0    10^3/ul


 


Nucleated RBC %  0    


 


INR (Anticoag Therapy)     (0.89-1.11)  


 


APTT     (26.0-36.3)  seconds


 


Sodium   141   (133-145)  mmol/L


 


Potassium   4.1   (3.5-5.0)  mmol/L


 


Chloride   107   (101-111)  mmol/L


 


Carbon Dioxide   27   (22-32)  mmol/L


 


Anion Gap   7   (2-11)  mmol/L


 


BUN   24   (6-24)  mg/dL


 


Creatinine   1.03 H   (0.51-0.95)  mg/dL


 


Est GFR ( Amer)   66.0   (>60)  


 


Est GFR (Non-Af Amer)   51.3   (>60)  


 


BUN/Creatinine Ratio   23.3 H   (8-20)  


 


Glucose   101 H   ()  mg/dL


 


Lactic Acid    0.9  (0.5-2.0)  mmol/L


 


Calcium   10.0   (8.6-10.3)  mg/dL


 


Total Bilirubin   1.80 H   (0.2-1.0)  mg/dL


 


AST   14   (13-39)  U/L


 


ALT   10   (7-52)  U/L


 


Alkaline Phosphatase   61   ()  U/L


 


Troponin I   0.00   (<0.04)  ng/mL


 


Total Protein   6.6   (6.4-8.9)  g/dL


 


Albumin   3.9   (3.2-5.2)  g/dL


 


Globulin   2.7   (2-4)  g/dL


 


Albumin/Globulin Ratio   1.4   (1-3)  














  02/20/17 Range/Units





  10:40 


 


WBC   (3.5-10.8)  10^3/ul


 


RBC   (4.0-5.4)  10^6/ul


 


Hgb   (12.0-16.0)  g/dl


 


Hct   (35-47)  %


 


MCV   (80-97)  fL


 


MCH   (27-31)  pg


 


MCHC   (31-36)  g/dl


 


RDW   (10.5-15)  %


 


Plt Count   (150-450)  10^3/ul


 


MPV   (7.4-10.4)  um3


 


Neut % (Auto)   (38-83)  %


 


Lymph % (Auto)   (25-47)  %


 


Mono % (Auto)   (1-9)  %


 


Eos % (Auto)   (0-6)  %


 


Baso % (Auto)   (0-2)  %


 


Absolute Neuts (auto)   (1.5-7.7)  10^3/ul


 


Absolute Lymphs (auto)   (1.0-4.8)  10^3/ul


 


Absolute Monos (auto)   (0-0.8)  10^3/ul


 


Absolute Eos (auto)   (0-0.6)  10^3/ul


 


Absolute Basos (auto)   (0-0.2)  10^3/ul


 


Absolute Nucleated RBC   10^3/ul


 


Nucleated RBC %   


 


INR (Anticoag Therapy)  0.96  (0.89-1.11)  


 


APTT  28.7  (26.0-36.3)  seconds


 


Sodium   (133-145)  mmol/L


 


Potassium   (3.5-5.0)  mmol/L


 


Chloride   (101-111)  mmol/L


 


Carbon Dioxide   (22-32)  mmol/L


 


Anion Gap   (2-11)  mmol/L


 


BUN   (6-24)  mg/dL


 


Creatinine   (0.51-0.95)  mg/dL


 


Est GFR ( Amer)   (>60)  


 


Est GFR (Non-Af Amer)   (>60)  


 


BUN/Creatinine Ratio   (8-20)  


 


Glucose   ()  mg/dL


 


Lactic Acid   (0.5-2.0)  mmol/L


 


Calcium   (8.6-10.3)  mg/dL


 


Total Bilirubin   (0.2-1.0)  mg/dL


 


AST   (13-39)  U/L


 


ALT   (7-52)  U/L


 


Alkaline Phosphatase   ()  U/L


 


Troponin I   (<0.04)  ng/mL


 


Total Protein   (6.4-8.9)  g/dL


 


Albumin   (3.2-5.2)  g/dL


 


Globulin   (2-4)  g/dL


 


Albumin/Globulin Ratio   (1-3)  











Result Diagrams: 


 02/20/17 10:40





 02/20/17 10:40


Lab Statement: Any lab studies that have been ordered have been reviewed, and 

results considered in the medical decision making process.





- CT


  ** CT Brain


CT Interpretation: Positive (See Comments) - IMPRESSION: CORTICAL ATROPHY WITH 

CHRONIC MICROVASCULAR ISCHEMIC CHANGES. NO ACUTE FINDINGS.


CT Interpretation Completed By: Radiologist





- EKG


  ** 10:25


Cardiac Rate: Bradycardia


EKG Rhythm: Sinus Bradycardia - 58 bpm





Hypertension Course/Dx





- Diagnoses


Provider Diagnoses: 


 Hypertensive urgency, CVA (cerebral vascular accident), SOB (shortness of 

breath)








- Critical Care Time


Critical Care Time: 30-74 min - 35 minutes





Discharge





- Discharge Plan


Condition: Stable


Disposition: ADMITTED TO [facility]


Referrals: 


Anil Sorto MD [Primary Care Provider] - 





The documentation as recorded by the Elissa avalos Alok accurately reflects 

the service I personally performed and the decisions made by me, Aquiles Ga MD.

## 2017-07-07 ENCOUNTER — HOSPITAL ENCOUNTER (OUTPATIENT)
Dept: HOSPITAL 25 - ED | Age: 82
Setting detail: OBSERVATION
LOS: 1 days | Discharge: HOME | End: 2017-07-08
Attending: INTERNAL MEDICINE | Admitting: HOSPITALIST
Payer: MEDICARE

## 2017-07-07 DIAGNOSIS — E03.9: ICD-10-CM

## 2017-07-07 DIAGNOSIS — R00.1: ICD-10-CM

## 2017-07-07 DIAGNOSIS — I48.91: ICD-10-CM

## 2017-07-07 DIAGNOSIS — K91.840: Primary | ICD-10-CM

## 2017-07-07 DIAGNOSIS — Z79.01: ICD-10-CM

## 2017-07-07 DIAGNOSIS — Z88.2: ICD-10-CM

## 2017-07-07 DIAGNOSIS — C67.9: ICD-10-CM

## 2017-07-07 DIAGNOSIS — I10: ICD-10-CM
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
## 2017-07-07 LAB
ALBUMIN SERPL BCG-MCNC: 3.2 G/DL (ref 3.2–5.2)
ALP SERPL-CCNC: 55 U/L (ref 34–104)
ALT SERPL W P-5'-P-CCNC: 11 U/L (ref 7–52)
ANION GAP SERPL CALC-SCNC: 3 MMOL/L (ref 2–11)
ANION GAP SERPL CALC-SCNC: 4 MMOL/L (ref 2–11)
AST SERPL-CCNC: 15 U/L (ref 13–39)
BUN SERPL-MCNC: 23 MG/DL (ref 6–24)
BUN SERPL-MCNC: 26 MG/DL (ref 6–24)
BUN/CREAT SERPL: 25.2 (ref 8–20)
BUN/CREAT SERPL: 26.4 (ref 8–20)
CALCIUM SERPL-MCNC: 8.5 MG/DL (ref 8.6–10.3)
CALCIUM SERPL-MCNC: 9.1 MG/DL (ref 8.6–10.3)
CHLORIDE SERPL-SCNC: 110 MMOL/L (ref 101–111)
CHLORIDE SERPL-SCNC: 113 MMOL/L (ref 101–111)
GLOBULIN SER CALC-MCNC: 2.1 G/DL (ref 2–4)
GLUCOSE SERPL-MCNC: 148 MG/DL (ref 70–100)
GLUCOSE SERPL-MCNC: 155 MG/DL (ref 70–100)
HCO3 SERPL-SCNC: 24 MMOL/L (ref 22–32)
HCO3 SERPL-SCNC: 25 MMOL/L (ref 22–32)
HCT VFR BLD AUTO: 25 % (ref 35–47)
HCT VFR BLD AUTO: 29 % (ref 35–47)
HCT VFR BLD AUTO: 34 % (ref 35–47)
HGB BLD-MCNC: 11.6 G/DL (ref 12–16)
HGB BLD-MCNC: 8.4 G/DL (ref 12–16)
HGB BLD-MCNC: 9.7 G/DL (ref 12–16)
LIPASE SERPL-CCNC: 54 U/L (ref 11–82)
MAGNESIUM SERPL-MCNC: 1.8 MG/DL (ref 1.9–2.7)
MCH RBC QN AUTO: 30 PG (ref 27–31)
MCH RBC QN AUTO: 30 PG (ref 27–31)
MCHC RBC AUTO-ENTMCNC: 34 G/DL (ref 31–36)
MCHC RBC AUTO-ENTMCNC: 34 G/DL (ref 31–36)
MCV RBC AUTO: 89 FL (ref 80–97)
MCV RBC AUTO: 89 FL (ref 80–97)
POTASSIUM SERPL-SCNC: 3.8 MMOL/L (ref 3.5–5)
POTASSIUM SERPL-SCNC: 3.9 MMOL/L (ref 3.5–5)
PROT SERPL-MCNC: 5.3 G/DL (ref 6.4–8.9)
RBC # BLD AUTO: 3.23 10^6/UL (ref 4–5.4)
RBC # BLD AUTO: 3.87 10^6/UL (ref 4–5.4)
SODIUM SERPL-SCNC: 139 MMOL/L (ref 133–145)
SODIUM SERPL-SCNC: 140 MMOL/L (ref 133–145)
WBC # BLD AUTO: 7.3 10^3/UL (ref 3.5–10.8)
WBC # BLD AUTO: 8.7 10^3/UL (ref 3.5–10.8)

## 2017-07-07 PROCEDURE — 85018 HEMOGLOBIN: CPT

## 2017-07-07 PROCEDURE — 85025 COMPLETE CBC W/AUTO DIFF WBC: CPT

## 2017-07-07 PROCEDURE — 86140 C-REACTIVE PROTEIN: CPT

## 2017-07-07 PROCEDURE — 83690 ASSAY OF LIPASE: CPT

## 2017-07-07 PROCEDURE — 36415 COLL VENOUS BLD VENIPUNCTURE: CPT

## 2017-07-07 PROCEDURE — 81003 URINALYSIS AUTO W/O SCOPE: CPT

## 2017-07-07 PROCEDURE — 85014 HEMATOCRIT: CPT

## 2017-07-07 PROCEDURE — 83735 ASSAY OF MAGNESIUM: CPT

## 2017-07-07 PROCEDURE — 99291 CRITICAL CARE FIRST HOUR: CPT

## 2017-07-07 PROCEDURE — 80048 BASIC METABOLIC PNL TOTAL CA: CPT

## 2017-07-07 PROCEDURE — 86901 BLOOD TYPING SEROLOGIC RH(D): CPT

## 2017-07-07 PROCEDURE — 81015 MICROSCOPIC EXAM OF URINE: CPT

## 2017-07-07 PROCEDURE — 85610 PROTHROMBIN TIME: CPT

## 2017-07-07 PROCEDURE — 83605 ASSAY OF LACTIC ACID: CPT

## 2017-07-07 PROCEDURE — 86900 BLOOD TYPING SEROLOGIC ABO: CPT

## 2017-07-07 PROCEDURE — 85027 COMPLETE CBC AUTOMATED: CPT

## 2017-07-07 PROCEDURE — 86850 RBC ANTIBODY SCREEN: CPT

## 2017-07-07 PROCEDURE — G0378 HOSPITAL OBSERVATION PER HR: HCPCS

## 2017-07-07 PROCEDURE — 93005 ELECTROCARDIOGRAM TRACING: CPT

## 2017-07-07 PROCEDURE — 80053 COMPREHEN METABOLIC PANEL: CPT

## 2017-07-07 NOTE — CONS
CC:  Dr. Sorto; Dr. Lukasz Nash, [facility], [place], [place] *

 

GASTROENTEROLOGY CONSULTATION

 

DATE OF CONSULT:  07/07/2017.

 

REASON FOR CONSULT:  Rectal bleeding, status post colonoscopy polypectomy.

 

HISTORY OF PRESENT ILLNESS:  This is a very pleasant, 83-year-old woman who 
yesterday, Thursday, July 6th, underwent a routine surveillance colonoscopy.  
By the colonoscopic note, two polyps were identified and removed; one from the 
cecum was removed with the hot snare and an Endoclip was placed, and a smaller 
polyp from the transverse colon was removed with the hot biopsy forceps.  There 
was also report of diverticulosis and internal hemorrhoids.  Postprocedure, the 
patient apparently did well and was discharged home, but by the late afternoon 
developed urgent bowel movements with rectal blood.  She was having abdominal 
cramps.  She states that she had perhaps seven bowel movements, all containing 
dark high volume blood and for that reason came to the emergency room.  Here in 
the emergency room, she had a near syncopal episode while expelling rectal 
blood.  She was treated with IV fluids and stabilized.  She was admitted 
overnight and since about midnight last night has had no further bowel 
movements or bleeding.  The patient denies any abdominal pain at this point.

 

PAST MEDICAL HISTORY:  Atrial fibrillation.  She had previously been on Xarelto
, but that was discontinued about a month ago.  She has a history of bladder 
cancer with cystoscopy treated locally, a right nephrectomy for kidney stones, 
and hypertension.

 

AMBULATORY MEDICINES:

1.  Metoprolol.

2.  Multivitamin.

3.  Lisinopril.

4.  She also takes an adult aspirin daily, although had held that for five days 
prior to her colonoscopy.

 

FAMILY HISTORY:  Notable for a mother who had coronary disease and colon cancer.

 

REVIEW OF SYSTEMS:  She denies any melena, nausea, or vomiting.  She denies any 
fevers, chills, or prior history of rectal bleeding.

 

PHYSICAL EXAM:  General:  She is a pleasant, elderly woman appearing 
comfortable and in no acute distress.  Vital Signs:  Blood pressure 105/46, 
heart rate 62 and irregular, her temperature is 97.3.  She is slightly pale.  
She appears clinically euvolemic.  Her lungs are clear.  Cardiac exam:  Regular 
rhythm without murmur. Abdomen:  Soft without tenderness or distention.  Bowel 
sounds are normoactive and there is no organomegaly.

 

DIAGNOSTIC STUDIES/LAB DATA:  Admission hemoglobin of 11.6, it was checked four 
hours later and was 9.7, INR of 0.98, BUN of 23, creatinine of 0.87.

 

IMPRESSION:  This is an 83-year-old woman who is one day status post 
colonoscopy polypectomy using electrocautery, coming in with rectal bleeding. 
This is almost certainly a postpolypectomy bleed, likely from the cecal 
polypectomy site given the need for Endoclip placement implying some level of 
difficulty with hemostasis.  She has had a fair amount of bleeding, although 
has maintained relative stability to her hemoglobin and has not had any further 
bleeding for the last five or six hours. Hopefully, this will cease 
spontaneously and this was discussed with the patient.

 

PLAN/RECOMMENDATIONS:  We will keep her on a clear liquid diet.  She will be 
monitored for 24 hours.  If rebleeding were to happen again, then we did 
discuss colonoscopy to achieve hemostasis during this admission.  If she 
remains stable, then perhaps discharge tomorrow would be appropriate.  We will 
hold her aspirin.

 

 [id number]/[facility] #: [id number]

DANY

## 2017-07-07 NOTE — HP
H&P (Free Text)


History and Physical: 





PCP: ZULEYKA Sorto MD





Date/Time of Evaluation: 2017 0200





CC: rectal bleeding





HPI: Mrs Kumar is an 82YO female HX AFIB who self-D/C'd her rivaroxaban 3weeks 

ago due to bleeding after cystoscopy followed by an episode of epistaxis. She 

went to [facility] Thursday AM for colonoscopy with polypectomy x2, a 10mm cecal 

polyp removed with hot snare and clipped and a 6mm transverse polyp removed 

with hot BX forceps. Diverticulosis and mild internal hemorrhoids were also 

noted. She did well the remainder of the day until ~1900 when she developed 

abdominal cramping and a total of 7 bright red bowel movements for which she 

called the number given by [facility] and was advised to come straight to the ED. 

Upon arrival to [facility] [place], she felt the urge to defecate and was assisted to the 

restroom where she had an 8th blood bowel movement resulting in syncope 

followed by 2 bloody stools once back in her ED room. Pressures were initially 

hypotensive, but rebounded with IVFs. She has developed some nausea in the ED, 

but no emesis, chest pain, SOB, palpitations, or other complaints.





PMedHx


bladder CA s/p cystoscopic resection & planning to start BCG instillation soon, 

followed by urology in [place]


pAFIB self D/C'd rivaroxaban 3weeks ago 2nd post-cystoscopic bleeding and an 

episode of epistaxis


HTN


urolithiasis


s/p R nephrectomy for infection & stones


diverticulosis


internal hemorrhoids





Ambulatory Orders


Metoprolol Succinate XL TAB* [Toprol XL TAB*] 50 mg PO DAILY 10/13/13 


Multivitamins/Minerals TAB* [Thera M Plus TAB*] 1 tab PO DAILY 16 


Lisinopril TAB* [Prinivil TAB 10 MG*] 20 mg PO DAILY 17 





Allergies


Sulfa Drugs Allergy (Intermediate, Verified 17 00:41)


 Hives


 SWELLING OF EYES AND TONGUE 





PSurgHx


cataract extraction


cholecystectomy


R nephrectomy 2nd infected stone at age 26


appendectomy


R TKA





SocHx: no tobacco, 1 glass wine daily, no recreational drugs; lives with her 

; DNR/I code status





FamHx: Mother:  late 80s, CAD, colon CA; Father:  age 51, 

hepatitis & liver failure





ROS: as above, otherwise reviewed and all were negative





Constitutional: NAD, normally developed, well-nourished elderly female


 


vitals: 


 Vital Signs











Temp  36.4 C   17 00:49


 


Pulse  70   17 01:31


 


Resp  13   17 01:31


 


BP  95/54   17 01:30


 


Pulse Ox  96   17 01:31








 Intake & Output











 17





 11:59 23:59 11:59


 


Weight   70.307 kg


 


Other:   


 


  Date of Last Bowel   17





  Movement   


 


  Estimated Stool Amount   Large








HEENM: atraumatic; sclera/conjunctiva: non-icteric/clear; hearing: clinically 

intact; oropharynx: clear, mucosa tacky





Neck: soft tissue: non-tender; thyroid: normal





Pulmonary: clear to auscultation bilaterally, good aeration, no accessory 

muscle use





CV: RR/RR, normal S1S2, no carotid bruit, no jugular venous distention, 2+ B DP/

PT, no edema





Abdominal: soft, non-distended, non-tender, no rebound/guarding/rigidity, 

normoactive bowel sounds, no hepatosplenomegaly or masses, no costovertebral 

angle tenderness





Musculoskeletal: general: grossly intact; gait: unsafe to ambulate





Integumental: normal appearance and texture of exposed skin, no over pallor 





Psychiatric 


orientation: AA&O to PPS


affect: calm


mood: cooperative


eye contact: good


content: reliable


responses: timely


insight: good to fair





Testing: 


 Lab Results











  17 Range/Units





  01:13 01:13 01:13 


 


WBC  7.3    (3.5-10.8)  10^3/ul


 


RBC  3.87 L    (4.0-5.4)  10^6/ul


 


Hgb  11.6 L    (12.0-16.0)  g/dl


 


Hct  34 L    (35-47)  %


 


MCV  89    (80-97)  fL


 


MCH  30    (27-31)  pg


 


MCHC  34    (31-36)  g/dl


 


RDW  13    (10.5-15)  %


 


Plt Count  210    (150-450)  10^3/ul


 


MPV  9    (7.4-10.4)  um3


 


Neut % (Auto)  56.0    (38-83)  %


 


Lymph % (Auto)  28.6    (25-47)  %


 


Mono % (Auto)  10.1 H    (1-9)  %


 


Eos % (Auto)  4.1    (0-6)  %


 


Baso % (Auto)  1.2    (0-2)  %


 


Absolute Neuts (auto)  4.1    (1.5-7.7)  10^3/ul


 


Absolute Lymphs (auto)  2.1    (1.0-4.8)  10^3/ul


 


Absolute Monos (auto)  0.7    (0-0.8)  10^3/ul


 


Absolute Eos (auto)  0.3    (0-0.6)  10^3/ul


 


Absolute Basos (auto)  0.1    (0-0.2)  10^3/ul


 


Absolute Nucleated RBC  0.01    10^3/ul


 


Nucleated RBC %  0.1    


 


INR (Anticoag Therapy)     (0.89-1.11)  


 


Sodium   139   (133-145)  mmol/L


 


Potassium   3.8   (3.5-5.0)  mmol/L


 


Chloride   110   (101-111)  mmol/L


 


Carbon Dioxide   25   (22-32)  mmol/L


 


Anion Gap   4   (2-11)  mmol/L


 


BUN   26 H   (6-24)  mg/dL


 


Creatinine   1.03 H   (0.51-0.95)  mg/dL


 


Est GFR ( Amer)   65.8   (>60)  


 


Est GFR (Non-Af Amer)   51.2   (>60)  


 


BUN/Creatinine Ratio   25.2 H   (8-20)  


 


Glucose   155 H   ()  mg/dL


 


Lactic Acid    1.8  (0.5-2.0)  mmol/L


 


Calcium   9.1   (8.6-10.3)  mg/dL


 


Magnesium   1.8 L   (1.9-2.7)  mg/dL


 


Total Bilirubin   0.80   (0.2-1.0)  mg/dL


 


AST   15   (13-39)  U/L


 


ALT   11   (7-52)  U/L


 


Alkaline Phosphatase   55   ()  U/L


 


C-Reactive Protein   4.03   (< 5.00)  mg/L


 


Total Protein   5.3 L   (6.4-8.9)  g/dL


 


Albumin   3.2   (3.2-5.2)  g/dL


 


Globulin   2.1   (2-4)  g/dL


 


Albumin/Globulin Ratio   1.5   (1-3)  


 


Lipase   54   (11.0-82.0)  U/L


 


Blood Type     


 


Antibody Screen     














  17 Range/Units





  01:13 01:13 


 


WBC    (3.5-10.8)  10^3/ul


 


RBC    (4.0-5.4)  10^6/ul


 


Hgb    (12.0-16.0)  g/dl


 


Hct    (35-47)  %


 


MCV    (80-97)  fL


 


MCH    (27-31)  pg


 


MCHC    (31-36)  g/dl


 


RDW    (10.5-15)  %


 


Plt Count    (150-450)  10^3/ul


 


MPV    (7.4-10.4)  um3


 


Neut % (Auto)    (38-83)  %


 


Lymph % (Auto)    (25-47)  %


 


Mono % (Auto)    (1-9)  %


 


Eos % (Auto)    (0-6)  %


 


Baso % (Auto)    (0-2)  %


 


Absolute Neuts (auto)    (1.5-7.7)  10^3/ul


 


Absolute Lymphs (auto)    (1.0-4.8)  10^3/ul


 


Absolute Monos (auto)    (0-0.8)  10^3/ul


 


Absolute Eos (auto)    (0-0.6)  10^3/ul


 


Absolute Basos (auto)    (0-0.2)  10^3/ul


 


Absolute Nucleated RBC    10^3/ul


 


Nucleated RBC %    


 


INR (Anticoag Therapy)   0.98  (0.89-1.11)  


 


Sodium    (133-145)  mmol/L


 


Potassium    (3.5-5.0)  mmol/L


 


Chloride    (101-111)  mmol/L


 


Carbon Dioxide    (22-32)  mmol/L


 


Anion Gap    (2-11)  mmol/L


 


BUN    (6-24)  mg/dL


 


Creatinine    (0.51-0.95)  mg/dL


 


Est GFR ( Amer)    (>60)  


 


Est GFR (Non-Af Amer)    (>60)  


 


BUN/Creatinine Ratio    (8-20)  


 


Glucose    ()  mg/dL


 


Lactic Acid    (0.5-2.0)  mmol/L


 


Calcium    (8.6-10.3)  mg/dL


 


Magnesium    (1.9-2.7)  mg/dL


 


Total Bilirubin    (0.2-1.0)  mg/dL


 


AST    (13-39)  U/L


 


ALT    (7-52)  U/L


 


Alkaline Phosphatase    ()  U/L


 


C-Reactive Protein    (< 5.00)  mg/L


 


Total Protein    (6.4-8.9)  g/dL


 


Albumin    (3.2-5.2)  g/dL


 


Globulin    (2-4)  g/dL


 


Albumin/Globulin Ratio    (1-3)  


 


Lipase    (11.0-82.0)  U/L


 


Blood Type  A Positive   


 


Antibody Screen  Pending   








ECG, personally reviewed: NSR rate 65, mild ST depression V3-6





Impression: 83F presenting with lower GI bleeding post colonoscopy w/ 

polypectomy x2





DIAGNOSIS & PLAN


Primary 


lower GI bleed post-colonoscopy w/ polypectomy x2


: suspect dislodged cecal clip


: IVFs


: type & screen


: telemetry


: supplemental oxygen


: NPO diet


: GI consult in AM


: supportive care





solitary kidney


: close monitoring of renal function


: prevention of significant prolonged hypotension, if possible





Secondary 


bladder CA


: continue outpatient follow up





pAFIB


: [id number] 2, intermediate risk of 4% per annum


: anticoagulation currently inappropriate 2nd acute bleed, but 





HTN


: hold outpatient regimen for now, monitor & re-institute once appropriate





urolithiasis


: no acute issues


: continue outpatient follow up





Admission Rational: inpatient for lower GI bleed s/p colonoscopy w/ BX not 

anticipated to be adequately resolved to allow for discharged w/i 48h


DVTp: SCDs, no anticoagulation in acute bleed


Code Status: DNR/I


HCP:
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
## 2017-07-07 NOTE — PN
Subjective


Date of Service: 07/07/17


Interval History: 





Last BM about 2 AM per patient and her .  No pain.  Overall feels well.  

Good appetite.





Objective


Active Medications: 








Acetaminophen (Tylenol Tab*)  650 mg PO Q6H PRN


   PRN Reason: FEVER/PAIN


Melatonin (Melatonin (Nf))  3 mg PO BEDTIME PRN; Protocol


   PRN Reason: Sleep


Omeprazole (Prilosec Cap*)  20 mg PO DAILY@0600 ALEXANDER


   Last Admin: 07/07/17 04:39 Dose:  20 mg


Ondansetron HCl (Zofran Inj*)  4 mg IV Q6H PRN


   PRN Reason: NAUSEA








 Vital Signs











  07/07/17 07/07/17 07/07/17





  02:15 02:30 02:45


 


Temperature   


 


Pulse Rate 69 72 


 


Respiratory 17 15 





Rate   


 


Blood Pressure 115/55 119/51 120/47





(mmHg)   


 


O2 Sat by Pulse 100 100 





Oximetry   














  07/07/17 07/07/17 07/07/17





  03:00 03:15 03:30


 


Temperature  97.3 F 


 


Pulse Rate 81 72 85


 


Respiratory 15 16 17





Rate   


 


Blood Pressure 118/53 139/55 135/89





(mmHg)   


 


O2 Sat by Pulse 100 100 100





Oximetry   














  07/07/17 07/07/17 07/07/17





  03:45 04:00 04:20


 


Temperature   97.6 F


 


Pulse Rate 81 79 


 


Respiratory 18 15 





Rate   


 


Blood Pressure 141/58 125/58 





(mmHg)   


 


O2 Sat by Pulse 100 100 





Oximetry   














  07/07/17 07/07/17 07/07/17





  04:33 07:09 08:00


 


Temperature 97.3 F 97.3 F 


 


Pulse Rate 72 62 


 


Respiratory 16 20 16





Rate   


 


Blood Pressure 139/55 105/46 





(mmHg)   


 


O2 Sat by Pulse 100 98 





Oximetry   














  07/07/17





  11:06


 


Temperature 


 


Pulse Rate 57


 


Respiratory 20





Rate 


 


Blood Pressure 118/61





(mmHg) 


 


O2 Sat by Pulse 100





Oximetry 











Oxygen Devices in Use Now: None


Appearance: Alert, partly up in bed.  In good spirits.  Looks comfortable.


Respiratory: Symmetrical Chest Expansion and Respiratory Effort, Clear to 

Auscultation, Clear to Percussion


Cardiovascular: NL Sounds; No Murmurs; No JVD, RRR, No Edema, -


Abdominal: NL Sounds; No Tenderness; No Distention, No Hepatosplenomegaly, -


Extremities: No Edema, No Clubbing, Cyanosis, -


Skin: No Rash or Ulcers, No Nodules or Sclerosis, -


Neurological: Alert and Oriented x 3, NL Sensation


Result Diagrams: 


 07/07/17 11:30





 07/07/17 05:41





Assess/Plan/Problems-Billing


Assessment: 











- Patient Problems


(1) GI bleeding


Current Visit: Yes   Status: Acute   Code(s): K92.2 - GASTROINTESTINAL 

HEMORRHAGE, UNSPECIFIED   SNOMED Code(s): 64408202


   Comment: S/P colonoscopy with bx in [place] 7/6.  Not currently bleeding.  CBC 

7/8, possible discharge then.   





(2) HTN (hypertension)


Current Visit: Yes   Status: Acute   Code(s): I10 - ESSENTIAL (PRIMARY) 

HYPERTENSION   SNOMED Code(s): 48905612


   Comment: Lisinopril on hold.   





(3) Bladder cancer


Current Visit: Yes   Status: Acute   Comment: Under treatment in [place], [place].

## 2017-07-08 VITALS — DIASTOLIC BLOOD PRESSURE: 66 MMHG | SYSTOLIC BLOOD PRESSURE: 140 MMHG

## 2017-07-08 LAB
HCT VFR BLD AUTO: 26 % (ref 35–47)
HGB BLD-MCNC: 8.9 G/DL (ref 12–16)
MCH RBC QN AUTO: 30 PG (ref 27–31)
MCHC RBC AUTO-ENTMCNC: 34 G/DL (ref 31–36)
MCV RBC AUTO: 88 FL (ref 80–97)
RBC # BLD AUTO: 3.01 10^6/UL (ref 4–5.4)
WBC # BLD AUTO: 5.2 10^3/UL (ref 3.5–10.8)

## 2017-07-08 NOTE — DCNOTE
Subjective


Date of Service: 07/08/17


Interval History: 





No c/o.  No BM since my visit yesterday.  Anxious to go hoem.  Walks without 

dizziness or SOB. 





Objective


Active Medications: 








Acetaminophen (Tylenol Tab*)  650 mg PO Q6H PRN


   PRN Reason: FEVER/PAIN


Ondansetron HCl (Zofran Inj*)  4 mg IV Q6H PRN


   PRN Reason: NAUSEA








 Vital Signs











  07/07/17 07/07/17 07/07/17





  11:06 15:22 19:07


 


Temperature  97.5 F 97.4 F


 


Pulse Rate 57 56 67


 


Respiratory 20 17 17





Rate   


 


Blood Pressure 118/61 125/64 133/69





(mmHg)   


 


O2 Sat by Pulse 100 99 100





Oximetry   














  07/07/17 07/07/17 07/07/17





  19:09 19:26 19:54


 


Temperature 97.4 F  


 


Pulse Rate   


 


Respiratory  18 16





Rate   


 


Blood Pressure 133/69  





(mmHg)   


 


O2 Sat by Pulse 100  





Oximetry   














  07/07/17 07/08/17 07/08/17





  19:56 00:12 02:09


 


Temperature  97.5 F 


 


Pulse Rate  89 


 


Respiratory  16 18





Rate   


 


Blood Pressure  137/68 





(mmHg)   


 


O2 Sat by Pulse 96 96 





Oximetry   














  07/08/17 07/08/17 07/08/17





  02:11 04:07 07:33


 


Temperature  98.1 F 97.7 F


 


Pulse Rate  73 58


 


Respiratory 18 16 18





Rate   


 


Blood Pressure  140/73 





(mmHg)   


 


O2 Sat by Pulse  96 98





Oximetry   














  07/08/17





  08:06


 


Temperature 


 


Pulse Rate 


 


Respiratory 





Rate 


 


Blood Pressure 140/66





(mmHg) 


 


O2 Sat by Pulse 





Oximetry 











Oxygen Devices in Use Now: None


Appearance: Alert, partly up in bed.  In good spirits.  Looks comfortable.


Eyes: No Scleral Icterus


Ears/Nose/Mouth/Throat: Clear Oropharnyx, Mucous Membranes Moist


Respiratory: Symmetrical Chest Expansion and Respiratory Effort, Clear to 

Auscultation, Clear to Percussion


Abdominal: NL Sounds; No Tenderness; No Distention, No Hepatosplenomegaly, -


Extremities: No Edema, No Clubbing, Cyanosis, -


Skin: No Rash or Ulcers, No Nodules or Sclerosis, -


Neurological: Alert and Oriented x 3, NL Sensation


Result Diagrams: 


 07/08/17 05:43





 07/07/17 05:41





Assess/Plan/Problems-Billing


Assessment: 











- Patient Problems


(1) GI bleeding


Current Visit: Yes   Status: Acute   Code(s): K92.2 - GASTROINTESTINAL 

HEMORRHAGE, UNSPECIFIED   SNOMED Code(s): 83532556


   Comment: S/P colonoscopy with bx in [place] 7/6.  Not currently bleeding.  Hct 

up to 26.  Discharge now,   





(2) HTN (hypertension)


Current Visit: Yes   Status: Acute   Code(s): I10 - ESSENTIAL (PRIMARY) 

HYPERTENSION   SNOMED Code(s): 41155730


   Comment: Re-start lisinopril at home, hold metoprolol until she sees Dr. Sorto.    





(3) Bladder cancer


Current Visit: Yes   Status: Acute   Comment: Under treatment in [place], [place].   


Status and Disposition: 





Discharge now.  Fup Dr. Sorto.

## 2017-07-08 NOTE — DS
CC:  Dr. Sorto.

 

DISCHARGE SUMMARY:

 

DATE OF ADMISSION:

 

DATE OF DISCHARGE:  07/08/17.

 

HISTORY:  This 83-year-old woman was admitted with rectal bleeding just a few hours after her colono
scopy with biopsy.  She was having a screening colonoscopy.  She did not have any syncope or lighthe
adedness, chest pain or shortness of breath. She did not require any transfusions. Her hemoglobin on
 arrival was 11.6, it fell to 8.4, but was 8.9 on the day of discharge.  She felt well.  She was abl
e to walk in [place] without any shortness of breath or excessive fatigue.  She was not lightheaded
.  She had no bleeding after about 2 a.m. on July 7th.  She had no bowel movements either.

 

She has not received either of her two antihypertensive medications during the hospital stay.  I am 
going to have her restart the lisinopril the day following discharge.  She will hold the metoprolol 
until she sees Dr. Sorto.

 

FINAL DIAGNOSES:

1.  GI bleeding following colonoscopy.

2.  Hypertension.

3.  Bladder cancer.

 

DISCHARGE MEDICATIONS:

1.  Multivitamin with mineral daily.

2.  Lisinopril 20 mg daily.

 

 [id number]/CPS #: [id number]

## 2017-07-08 NOTE — DCNOTE
Subjective


Date of Service: 07/08/17


Interval History: 





Amended note.





Objective


Active Medications: 








Acetaminophen (Tylenol Tab*)  650 mg PO Q6H PRN


   PRN Reason: FEVER/PAIN


Metoprolol Succinate (Toprol Xl Tab*)  50 mg PO DAILY ALEXANDER


Ondansetron HCl (Zofran Inj*)  4 mg IV Q6H PRN


   PRN Reason: NAUSEA








 Vital Signs











  07/07/17 07/07/17 07/07/17





  11:06 15:22 19:07


 


Temperature  97.5 F 97.4 F


 


Pulse Rate 57 56 67


 


Respiratory 20 17 17





Rate   


 


Blood Pressure 118/61 125/64 133/69





(mmHg)   


 


O2 Sat by Pulse 100 99 100





Oximetry   














  07/07/17 07/07/17 07/07/17





  19:09 19:26 19:54


 


Temperature 97.4 F  


 


Pulse Rate   


 


Respiratory  18 16





Rate   


 


Blood Pressure 133/69  





(mmHg)   


 


O2 Sat by Pulse 100  





Oximetry   














  07/07/17 07/08/17 07/08/17





  19:56 00:12 02:09


 


Temperature  97.5 F 


 


Pulse Rate  89 


 


Respiratory  16 18





Rate   


 


Blood Pressure  137/68 





(mmHg)   


 


O2 Sat by Pulse 96 96 





Oximetry   














  07/08/17 07/08/17 07/08/17





  02:11 04:07 07:33


 


Temperature  98.1 F 97.7 F


 


Pulse Rate  73 58


 


Respiratory 18 16 18





Rate   


 


Blood Pressure  140/73 





(mmHg)   


 


O2 Sat by Pulse  96 98





Oximetry   














  07/08/17 07/08/17





  08:00 08:06


 


Temperature  


 


Pulse Rate  


 


Respiratory 18 





Rate  


 


Blood Pressure  140/66





(mmHg)  


 


O2 Sat by Pulse  





Oximetry  











Oxygen Devices in Use Now: None


Result Diagrams: 


 07/08/17 05:43





 07/07/17 05:41





Assess/Plan/Problems-Billing


Assessment: 











- Patient Problems


(1) GI bleeding


Current Visit: Yes   Status: Acute   Code(s): K92.2 - GASTROINTESTINAL 

HEMORRHAGE, UNSPECIFIED   SNOMED Code(s): 17520033


   Comment: S/P colonoscopy with bx in [place] 7/6.  Not currently bleeding.  Hct 

up to 26.  Discharge now,   





(2) HTN (hypertension)


Current Visit: Yes   Status: Acute   Code(s): I10 - ESSENTIAL (PRIMARY) 

HYPERTENSION   SNOMED Code(s): 30149903


   Comment: Re-start metoprolol XL now, hold lisinopril until she sees Dr. Sorto.    





(3) Bladder cancer


Current Visit: Yes   Status: Acute   Comment: Under treatment in [place], [place].   


Status and Disposition: 





Discharge now.  Fup Dr. Sorto.

## 2017-10-15 ENCOUNTER — HOSPITAL ENCOUNTER (EMERGENCY)
Dept: HOSPITAL 25 - ED | Age: 82
Discharge: HOME | End: 2017-10-15
Payer: MEDICARE

## 2017-10-15 VITALS — SYSTOLIC BLOOD PRESSURE: 168 MMHG | DIASTOLIC BLOOD PRESSURE: 84 MMHG

## 2017-10-15 DIAGNOSIS — C67.9: ICD-10-CM

## 2017-10-15 DIAGNOSIS — Z98.42: ICD-10-CM

## 2017-10-15 DIAGNOSIS — I10: Primary | ICD-10-CM

## 2017-10-15 DIAGNOSIS — M79.601: ICD-10-CM

## 2017-10-15 DIAGNOSIS — Z87.442: ICD-10-CM

## 2017-10-15 DIAGNOSIS — Z98.41: ICD-10-CM

## 2017-10-15 DIAGNOSIS — E03.9: ICD-10-CM

## 2017-10-15 DIAGNOSIS — Z90.49: ICD-10-CM

## 2017-10-15 DIAGNOSIS — Z96.651: ICD-10-CM

## 2017-10-15 DIAGNOSIS — Z88.2: ICD-10-CM

## 2017-10-15 DIAGNOSIS — G43.909: ICD-10-CM

## 2017-10-15 DIAGNOSIS — M54.2: ICD-10-CM

## 2017-10-15 DIAGNOSIS — Z87.891: ICD-10-CM

## 2017-10-15 DIAGNOSIS — Z87.440: ICD-10-CM

## 2017-10-15 LAB
ALBUMIN SERPL BCG-MCNC: 4.3 G/DL (ref 3.2–5.2)
ALP SERPL-CCNC: 76 U/L (ref 34–104)
ALT SERPL W P-5'-P-CCNC: 16 U/L (ref 7–52)
ANION GAP SERPL CALC-SCNC: 6 MMOL/L (ref 2–11)
AST SERPL-CCNC: 17 U/L (ref 13–39)
BUN SERPL-MCNC: 16 MG/DL (ref 6–24)
BUN/CREAT SERPL: 15.7 (ref 8–20)
CALCIUM SERPL-MCNC: 10 MG/DL (ref 8.6–10.3)
CHLORIDE SERPL-SCNC: 105 MMOL/L (ref 101–111)
GLOBULIN SER CALC-MCNC: 2.5 G/DL (ref 2–4)
GLUCOSE SERPL-MCNC: 114 MG/DL (ref 70–100)
HCO3 SERPL-SCNC: 27 MMOL/L (ref 22–32)
HCT VFR BLD AUTO: 45 % (ref 35–47)
HGB BLD-MCNC: 15 G/DL (ref 12–16)
MCH RBC QN AUTO: 29 PG (ref 27–31)
MCHC RBC AUTO-ENTMCNC: 33 G/DL (ref 31–36)
MCV RBC AUTO: 86 FL (ref 80–97)
POTASSIUM SERPL-SCNC: 3.9 MMOL/L (ref 3.5–5)
PROT SERPL-MCNC: 6.8 G/DL (ref 6.4–8.9)
RBC # BLD AUTO: 5.26 10^6/UL (ref 4–5.4)
SODIUM SERPL-SCNC: 138 MMOL/L (ref 133–145)
TROPONIN I SERPL-MCNC: 0 NG/ML (ref ?–0.04)
TSH SERPL-ACNC: 4.51 MCIU/ML (ref 0.34–5.6)
WBC # BLD AUTO: 6.1 10^3/UL (ref 3.5–10.8)

## 2017-10-15 PROCEDURE — 83605 ASSAY OF LACTIC ACID: CPT

## 2017-10-15 PROCEDURE — 81003 URINALYSIS AUTO W/O SCOPE: CPT

## 2017-10-15 PROCEDURE — 81015 MICROSCOPIC EXAM OF URINE: CPT

## 2017-10-15 PROCEDURE — 84484 ASSAY OF TROPONIN QUANT: CPT

## 2017-10-15 PROCEDURE — 70450 CT HEAD/BRAIN W/O DYE: CPT

## 2017-10-15 PROCEDURE — 93005 ELECTROCARDIOGRAM TRACING: CPT

## 2017-10-15 PROCEDURE — 87086 URINE CULTURE/COLONY COUNT: CPT

## 2017-10-15 PROCEDURE — 84443 ASSAY THYROID STIM HORMONE: CPT

## 2017-10-15 PROCEDURE — 85610 PROTHROMBIN TIME: CPT

## 2017-10-15 PROCEDURE — 99282 EMERGENCY DEPT VISIT SF MDM: CPT

## 2017-10-15 PROCEDURE — 85025 COMPLETE CBC W/AUTO DIFF WBC: CPT

## 2017-10-15 PROCEDURE — 80053 COMPREHEN METABOLIC PANEL: CPT

## 2017-10-15 PROCEDURE — 36415 COLL VENOUS BLD VENIPUNCTURE: CPT

## 2017-10-15 NOTE — RAD
INDICATION:  Headache.



COMPARISON:  Comparison is made with prior MRI and CT of the brain from February 20, 2017.



TECHNIQUE: Contiguous axial sections of the brain were obtained from the skull base to the

vertex without contrast.



FINDINGS: The ventricles, cisterns and sulci are enlarged consistent with diffuse atrophy.

 There are small areas of decreased density in the subcortical and periventricular white

matter suggestive of mild chronic small vessel ischemic changes. There appears to be a

small old lacunar infarct present within the right lentiform nucleus. No other focal

abnormality or mass effect is seen. There is no evidence for hemorrhage.



No significant focal osseous abnormality is seen. The visualized portion of the paranasal

sinuses and mastoid air cells appear clear.



IMPRESSION: 

1. NO EVIDENCE FOR ACUTE INTRACRANIAL ABNORMALITY.

2. OLD LACUNAR INFARCT AND FINDINGS CONSISTENT WITH MILD CHRONIC SMALL VESSEL ISCHEMIC

CHANGES.
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
## 2018-03-26 ENCOUNTER — HOSPITAL ENCOUNTER (EMERGENCY)
Dept: HOSPITAL 25 - UCCORT | Age: 83
Discharge: HOME | End: 2018-03-26
Payer: MEDICARE

## 2018-03-26 VITALS — DIASTOLIC BLOOD PRESSURE: 74 MMHG | SYSTOLIC BLOOD PRESSURE: 146 MMHG

## 2018-03-26 DIAGNOSIS — Z87.442: ICD-10-CM

## 2018-03-26 DIAGNOSIS — R31.9: Primary | ICD-10-CM

## 2018-03-26 DIAGNOSIS — Z88.2: ICD-10-CM

## 2018-03-26 DIAGNOSIS — Z85.51: ICD-10-CM

## 2018-03-26 DIAGNOSIS — I10: ICD-10-CM

## 2018-03-26 DIAGNOSIS — Z87.891: ICD-10-CM

## 2018-03-26 PROCEDURE — 81003 URINALYSIS AUTO W/O SCOPE: CPT

## 2018-03-26 PROCEDURE — 99211 OFF/OP EST MAY X REQ PHY/QHP: CPT

## 2018-03-26 PROCEDURE — G0463 HOSPITAL OUTPT CLINIC VISIT: HCPCS

## 2018-03-26 NOTE — UC
Complaint Female HPI





- HPI Summary


HPI Summary: 





Pt c/o HA, pelvic pressure and generalized weakness and fatigue X 1 week.  Pt 

is concerned that she has a UTI. Pt has history of kidney stones and bladder 

cancer.  





- History Of Current Complaint


Stated Complaint: URINARY COMPLAINT


Time Seen by Provider: 03/26/18 10:53


Hx Obtained From: Patient


Pregnant?: No


Onset/Duration: Gradual Onset, Lasting Weeks - 1, Still Present


Timing: Constant


Severity Initially: Mild


Severity Currently: Mild


Character: Dull


Aggravating Factor(s): Nothing


Alleviating Factor(s): Nothing


Associated Signs And Symptoms: Positive: Negative





- Risk Factors


Ectopic Pregnancy Risk Factor: Negative


Ovarian Torsion Risk Factor: Negative





- Allergies/Home Medications


Allergies/Adverse Reactions: 


 Allergies











Allergy/AdvReac Type Severity Reaction Status Date / Time


 


Sulfa (Sulfonamide Allergy Intermediate Rash Verified 03/26/18 11:21





Antibiotics)     














PMH/Surg Hx/FS Hx/Imm Hx


Previously Healthy: No


Cardiovascular History: Hypertension


GI/ History: Kidney Stones, Other - bladder cancer


Other GI/ History: bladder cancer


Cancer History: Other - bladder


Other Cancer History: bladder 





- Surgical History


Surgical History: Yes


Surgery Procedure, Year, and Place: RIGHT TKA.  CHOLECYSTECTOMY.  Transurethral 

resection (June/July 2017 and 10/11/17).  RIGHT KIDNEY REMOVED @ age 26.  

ureter stent placement & removal Nov 2016





- Family History


Known Family History: Positive: Cardiac Disease, Hypertension


   Negative: Diabetes





- Social History


Occupation: Retired


Lives: With Family


Alcohol Use: None


Alcohol Amount: one wine


Substance Use Type: None


Smoking Status (MU): Former Smoker


Amount Used/How Often: 1/2 ppd


Length of Time of Smoking/Using Tobacco: 10 years


Have You Smoked in the Last Year: No


When Did the Patient Quit Smoking/Using Tobacco: 1/1/1978





- Immunization History


Most Recent Influenza Vaccination: 2015


Most Recent Pneumonia Vaccination: 4-5 years ago.





Review of Systems


Constitutional: Fatigue


Skin: Negative


Eyes: Blurred Vision - intermittent


ENT: Negative


Respiratory: Negative


Cardiovascular: Negative


Gastrointestinal: Other - pelvic discomfort


Genitourinary: Negative


Motor: Negative, Decreased ROM


Neurovascular: Negative


Musculoskeletal: Negative


Neurological: Headache, Weakness


Psychological: Negative


Is Patient Immunocompromised?: No


All Other Systems Reviewed And Are Negative: Yes





Physical Exam


Triage Information Reviewed: Yes


Appearance: Well-Appearing


Vital Signs: 


 Initial Vital Signs











Temp  98.2 F   03/26/18 11:14


 


Pulse  61   03/26/18 11:14


 


Resp  16   03/26/18 11:14


 


BP  146/74   03/26/18 11:14


 


Pulse Ox  100   03/26/18 11:14











Vital Signs Reviewed: Yes


Eye Exam: Normal


ENT Exam: Normal


Neck exam: Normal


Respiratory Exam: Normal


Cardiovascular Exam: Normal


Abdominal Exam: Normal


Musculoskeletal Exam: Normal


Musculoskeletal: Positive: Strength Intact


Neurological Exam: Normal


Psychological Exam: Normal


Skin Exam: Normal





Diagnostics





- Laboratory


Diagnostic Studies Completed/Ordered: UA: see chart





 Complaint Female Dx





- Course


Course Of Treatment: Pt has hx of bladder cancer.  Have concern for recurrence 

of cancer, pt is scheduled to see urologist in next week. Pt denies that HA is 

worse HA ever denies unilateral weakness.





- Differential Dx/Diagnosis


Differential Diagnosis/HQI/PQRI: Urinary Tract Infection


Provider Diagnoses: hematuria





Discharge





- Sign-Out/Discharge


Documenting (check all that apply): Discharge





- Discharge Plan


Condition: Stable


Disposition: HOME


Patient Education Materials:  Hematuria (ED), General Headache (ED)


Referrals: 


Anil Sorto MD [Primary Care Provider] - As Soon As Possible


Additional Instructions: 


Please follow up with your PCP as soon as possible.  Please keep your 

appointment with your urologist as scheduled. 





- Billing Disposition and Condition


Condition: STABLE


Disposition: HOME

## 2018-09-16 ENCOUNTER — HOSPITAL ENCOUNTER (EMERGENCY)
Dept: HOSPITAL 25 - ED | Age: 83
Discharge: HOME | End: 2018-09-16
Payer: COMMERCIAL

## 2018-09-16 VITALS — DIASTOLIC BLOOD PRESSURE: 81 MMHG | SYSTOLIC BLOOD PRESSURE: 169 MMHG

## 2018-09-16 DIAGNOSIS — R60.0: ICD-10-CM

## 2018-09-16 DIAGNOSIS — Z85.51: ICD-10-CM

## 2018-09-16 DIAGNOSIS — I10: Primary | ICD-10-CM

## 2018-09-16 DIAGNOSIS — E03.9: ICD-10-CM

## 2018-09-16 DIAGNOSIS — Z87.891: ICD-10-CM

## 2018-09-16 LAB
BASOPHILS # BLD AUTO: 0 10^3/UL (ref 0–0.2)
EOSINOPHIL # BLD AUTO: 0.2 10^3/UL (ref 0–0.6)
HCT VFR BLD AUTO: 40 % (ref 35–47)
HGB BLD-MCNC: 13.6 G/DL (ref 12–16)
INR PPP/BLD: 0.94 (ref 0.77–1.02)
LYMPHOCYTES # BLD AUTO: 1.1 10^3/UL (ref 1–4.8)
MCH RBC QN AUTO: 30 PG (ref 27–31)
MCHC RBC AUTO-ENTMCNC: 34 G/DL (ref 31–36)
MCV RBC AUTO: 87 FL (ref 80–97)
MONOCYTES # BLD AUTO: 0.5 10^3/UL (ref 0–0.8)
NEUTROPHILS # BLD AUTO: 3.6 10^3/UL (ref 1.5–7.7)
NRBC # BLD AUTO: 0 10^3/UL
NRBC BLD QL AUTO: 0.1
PLATELET # BLD AUTO: 206 10^3/UL (ref 150–450)
RBC # BLD AUTO: 4.57 10^6/UL (ref 4–5.4)
RBC UR QL AUTO: (no result)
WBC # BLD AUTO: 5.4 10^3/UL (ref 3.5–10.8)
WBC UR QL AUTO: (no result)

## 2018-09-16 PROCEDURE — 81015 MICROSCOPIC EXAM OF URINE: CPT

## 2018-09-16 PROCEDURE — 85730 THROMBOPLASTIN TIME PARTIAL: CPT

## 2018-09-16 PROCEDURE — 93005 ELECTROCARDIOGRAM TRACING: CPT

## 2018-09-16 PROCEDURE — 85025 COMPLETE CBC W/AUTO DIFF WBC: CPT

## 2018-09-16 PROCEDURE — 85610 PROTHROMBIN TIME: CPT

## 2018-09-16 PROCEDURE — 84484 ASSAY OF TROPONIN QUANT: CPT

## 2018-09-16 PROCEDURE — 96361 HYDRATE IV INFUSION ADD-ON: CPT

## 2018-09-16 PROCEDURE — 96375 TX/PRO/DX INJ NEW DRUG ADDON: CPT

## 2018-09-16 PROCEDURE — 96374 THER/PROPH/DIAG INJ IV PUSH: CPT

## 2018-09-16 PROCEDURE — 81003 URINALYSIS AUTO W/O SCOPE: CPT

## 2018-09-16 PROCEDURE — 70498 CT ANGIOGRAPHY NECK: CPT

## 2018-09-16 PROCEDURE — 80053 COMPREHEN METABOLIC PANEL: CPT

## 2018-09-16 PROCEDURE — 87086 URINE CULTURE/COLONY COUNT: CPT

## 2018-09-16 PROCEDURE — 70496 CT ANGIOGRAPHY HEAD: CPT

## 2018-09-16 PROCEDURE — 36415 COLL VENOUS BLD VENIPUNCTURE: CPT

## 2018-09-16 PROCEDURE — 83605 ASSAY OF LACTIC ACID: CPT

## 2018-09-16 PROCEDURE — 99283 EMERGENCY DEPT VISIT LOW MDM: CPT

## 2018-09-16 NOTE — RAD
CPT II: CPT II Codes: 3100F



INDICATION:  Hypertension and headache



COMPARISON:  CT of the brain dated October 15, 2017



TECHNIQUE:

A CT angiogram of the head and neck was performed with 80 cc of Visipaque 320. Prior to

contrast injection, day noncontrast CT of the brain was acquired. Contiguous axial

sections were obtained from the thoracic inlet through the [place].  Images were

reconstructed in the sagittal, coronal planes and in a 3-D volume rendered format.  The

distal cervical internal carotid artery diameter is used as the denominater for stenosis

measurement.  



CTA NECK:



The common and internal carotid arteries are patent without hemodynamically significant

stenosis.  



Right:

Immediately below the carotid bifurcation the common carotid artery measures 7 mm in short

axis diameter. There is faint calcification of the carotid bulb but the bulb measures 8 mm

in short axis diameter yielding 0% degree stenosis.



Left:

Immediately below the carotid bifurcation the common carotid artery measures 7 mm in short

axis diameter. There is faint calcification of the carotid bulb but the bulb measures 7 mm

in short axis diameter yielding 0% degree stenosis.



The vertebral arteries are patent without gross abnormality.



There is calcified atherosclerosis at the origin of the left subclavian artery but patency

is adequately maintained.



CTA of the brain:  



The internal carotid, anterior and middle cerebral arteries appear are patent without high

grade stenosis or occlusion.  



The vertebral, basilar and posterior cerebral arteries appear patent without high grade

stenosis or occlusion.



The [place] is complete with bilateral posterior communicating arteries

identified.



No focal luminal filling defect, aneurysm or vascular malformation is seen.



NON-ARTERIAL FINDINGS:

Similar the prior CT of the brain, there is periventricular and subcortical white matter

hypoattenuation seen on the noncontrast imaging consistent with chronic microvascular

disease.



IMPRESSION:  

1. Normal CT angiography of the head and neck.

2. Similar to the prior CT of the brain, noncontrast imaging is consistent with mild

chronic microvascular disease.
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
## 2018-09-16 NOTE — RAD
HISTORY: Left leg edema



TECHNIQUE: Multiple transverse and longitudinal ultrasound images were obtained of the

veins of the left lower extremity using grayscale, color Doppler, and spectral Doppler

imaging with and without compression and with augmentation. 



FINDINGS:



VEINS: The common femoral vein, deep femoral vein, femoral vein and popliteal vein are

compressible throughout their course, with normal flow on color Doppler imaging and normal

response to augmentation on spectral Doppler imaging.



SOFT TISSUES: Grossly normal. No large popliteal fossa cyst was identified.



IMPRESSION: 

No sonographic evidence of deep vein thrombosis.